# Patient Record
Sex: FEMALE | Race: WHITE | Employment: OTHER | URBAN - METROPOLITAN AREA
[De-identification: names, ages, dates, MRNs, and addresses within clinical notes are randomized per-mention and may not be internally consistent; named-entity substitution may affect disease eponyms.]

---

## 2019-03-05 ENCOUNTER — LAB ONLY (OUTPATIENT)
Dept: INTERNAL MEDICINE CLINIC | Age: 79
End: 2019-03-05

## 2019-03-05 DIAGNOSIS — I10 ESSENTIAL HYPERTENSION: ICD-10-CM

## 2019-03-05 DIAGNOSIS — D36.9 MULTIPLE ADENOMATOUS POLYPS: ICD-10-CM

## 2019-03-05 DIAGNOSIS — R07.89 ATYPICAL CHEST PAIN: ICD-10-CM

## 2019-03-05 DIAGNOSIS — E23.7 LESION OF PITUITARY GLAND (HCC): ICD-10-CM

## 2019-03-05 DIAGNOSIS — E78.2 MIXED HYPERLIPIDEMIA: ICD-10-CM

## 2019-03-05 DIAGNOSIS — I67.89 GENERALIZED ISCHEMIC CEREBROVASCULAR DISEASE: ICD-10-CM

## 2019-03-05 DIAGNOSIS — E03.9 ACQUIRED HYPOTHYROIDISM: Primary | ICD-10-CM

## 2019-03-05 PROBLEM — K21.9 GASTROESOPHAGEAL REFLUX DISEASE WITHOUT ESOPHAGITIS: Status: ACTIVE | Noted: 2019-03-05

## 2019-03-05 PROBLEM — M15.9 PRIMARY OSTEOARTHRITIS INVOLVING MULTIPLE JOINTS: Status: ACTIVE | Noted: 2019-03-05

## 2019-03-05 PROBLEM — J30.1 SEASONAL ALLERGIC RHINITIS DUE TO POLLEN: Status: ACTIVE | Noted: 2019-03-05

## 2019-03-06 LAB
ALBUMIN SERPL-MCNC: 4.5 G/DL (ref 3.5–4.8)
ALBUMIN/GLOB SERPL: 1.6 {RATIO} (ref 1.2–2.2)
ALP SERPL-CCNC: 101 IU/L (ref 39–117)
ALT SERPL-CCNC: 17 IU/L (ref 0–32)
APPEARANCE UR: ABNORMAL
AST SERPL-CCNC: 19 IU/L (ref 0–40)
BACTERIA #/AREA URNS HPF: ABNORMAL /[HPF]
BASOPHILS # BLD AUTO: 0.1 X10E3/UL (ref 0–0.2)
BASOPHILS NFR BLD AUTO: 1 %
BILIRUB SERPL-MCNC: 0.4 MG/DL (ref 0–1.2)
BILIRUB UR QL STRIP: NEGATIVE
BUN SERPL-MCNC: 19 MG/DL (ref 8–27)
BUN/CREAT SERPL: 19 (ref 12–28)
CALCIUM SERPL-MCNC: 9.8 MG/DL (ref 8.7–10.3)
CASTS URNS QL MICRO: ABNORMAL /LPF
CHLORIDE SERPL-SCNC: 101 MMOL/L (ref 96–106)
CHOLEST SERPL-MCNC: 216 MG/DL (ref 100–199)
CO2 SERPL-SCNC: 25 MMOL/L (ref 20–29)
COLOR UR: YELLOW
CREAT SERPL-MCNC: 0.98 MG/DL (ref 0.57–1)
EOSINOPHIL # BLD AUTO: 0.3 X10E3/UL (ref 0–0.4)
EOSINOPHIL NFR BLD AUTO: 5 %
EPI CELLS #/AREA URNS HPF: ABNORMAL /HPF
ERYTHROCYTE [DISTWIDTH] IN BLOOD BY AUTOMATED COUNT: 14.1 % (ref 12.3–15.4)
GLOBULIN SER CALC-MCNC: 2.8 G/DL (ref 1.5–4.5)
GLUCOSE SERPL-MCNC: 88 MG/DL (ref 65–99)
GLUCOSE UR QL: NEGATIVE
HCT VFR BLD AUTO: 43.2 % (ref 34–46.6)
HDLC SERPL-MCNC: 54 MG/DL
HGB BLD-MCNC: 14 G/DL (ref 11.1–15.9)
HGB UR QL STRIP: NEGATIVE
IMM GRANULOCYTES # BLD AUTO: 0 X10E3/UL (ref 0–0.1)
IMM GRANULOCYTES NFR BLD AUTO: 0 %
KETONES UR QL STRIP: NEGATIVE
LDLC SERPL CALC-MCNC: 136 MG/DL (ref 0–99)
LEUKOCYTE ESTERASE UR QL STRIP: ABNORMAL
LYMPHOCYTES # BLD AUTO: 1.8 X10E3/UL (ref 0.7–3.1)
LYMPHOCYTES NFR BLD AUTO: 29 %
MCH RBC QN AUTO: 30.4 PG (ref 26.6–33)
MCHC RBC AUTO-ENTMCNC: 32.4 G/DL (ref 31.5–35.7)
MCV RBC AUTO: 94 FL (ref 79–97)
MICRO URNS: ABNORMAL
MONOCYTES # BLD AUTO: 0.7 X10E3/UL (ref 0.1–0.9)
MONOCYTES NFR BLD AUTO: 12 %
MUCOUS THREADS URNS QL MICRO: PRESENT
NEUTROPHILS # BLD AUTO: 3.4 X10E3/UL (ref 1.4–7)
NEUTROPHILS NFR BLD AUTO: 53 %
NITRITE UR QL STRIP: NEGATIVE
PH UR STRIP: 5.5 [PH] (ref 5–7.5)
PLATELET # BLD AUTO: 261 X10E3/UL (ref 150–379)
POTASSIUM SERPL-SCNC: 4.2 MMOL/L (ref 3.5–5.2)
PROT SERPL-MCNC: 7.3 G/DL (ref 6–8.5)
PROT UR QL STRIP: NEGATIVE
RBC # BLD AUTO: 4.6 X10E6/UL (ref 3.77–5.28)
RBC #/AREA URNS HPF: ABNORMAL /HPF
SODIUM SERPL-SCNC: 139 MMOL/L (ref 134–144)
SP GR UR: 1.02 (ref 1–1.03)
TRIGL SERPL-MCNC: 132 MG/DL (ref 0–149)
TSH SERPL DL<=0.005 MIU/L-ACNC: 2.98 UIU/ML (ref 0.45–4.5)
UROBILINOGEN UR STRIP-MCNC: 0.2 MG/DL (ref 0.2–1)
VLDLC SERPL CALC-MCNC: 26 MG/DL (ref 5–40)
WBC # BLD AUTO: 6.3 X10E3/UL (ref 3.4–10.8)
WBC #/AREA URNS HPF: ABNORMAL /HPF

## 2019-03-12 ENCOUNTER — OFFICE VISIT (OUTPATIENT)
Dept: INTERNAL MEDICINE CLINIC | Age: 79
End: 2019-03-12

## 2019-03-12 VITALS
OXYGEN SATURATION: 98 % | DIASTOLIC BLOOD PRESSURE: 64 MMHG | HEART RATE: 76 BPM | HEIGHT: 64 IN | BODY MASS INDEX: 25.44 KG/M2 | SYSTOLIC BLOOD PRESSURE: 108 MMHG | TEMPERATURE: 97.6 F | RESPIRATION RATE: 18 BRPM | WEIGHT: 149 LBS

## 2019-03-12 DIAGNOSIS — E03.9 ACQUIRED HYPOTHYROIDISM: ICD-10-CM

## 2019-03-12 DIAGNOSIS — I70.90 ATHEROSCLEROSIS: ICD-10-CM

## 2019-03-12 DIAGNOSIS — E78.2 MIXED HYPERLIPIDEMIA: Primary | ICD-10-CM

## 2019-03-12 DIAGNOSIS — E23.7 LESION OF PITUITARY GLAND (HCC): ICD-10-CM

## 2019-03-12 PROBLEM — M85.88 OSTEOPENIA OF LUMBAR SPINE: Status: ACTIVE | Noted: 2019-03-12

## 2019-03-12 RX ORDER — POLYETHYLENE GLYCOL 3350 17 G/17G
17 POWDER, FOR SOLUTION ORAL DAILY
COMMUNITY

## 2019-03-12 RX ORDER — EZETIMIBE 10 MG/1
10 TABLET ORAL DAILY
Qty: 90 TAB | Refills: 3 | Status: SHIPPED | OUTPATIENT
Start: 2019-03-12 | End: 2019-03-15 | Stop reason: SDUPTHER

## 2019-03-12 RX ORDER — OMEPRAZOLE 20 MG/1
20 CAPSULE, DELAYED RELEASE ORAL DAILY
COMMUNITY

## 2019-03-12 RX ORDER — LEVOCETIRIZINE DIHYDROCHLORIDE 5 MG/1
1 TABLET, FILM COATED ORAL DAILY
COMMUNITY
Start: 2018-12-28

## 2019-03-12 RX ORDER — ASPIRIN 81 MG/1
TABLET ORAL DAILY
COMMUNITY

## 2019-03-12 RX ORDER — ATORVASTATIN CALCIUM 10 MG/1
TABLET, FILM COATED ORAL DAILY
COMMUNITY
End: 2019-03-15 | Stop reason: SDUPTHER

## 2019-03-12 RX ORDER — IPRATROPIUM BROMIDE 42 UG/1
2 SPRAY, METERED NASAL 4 TIMES DAILY
COMMUNITY

## 2019-03-12 RX ORDER — LEVOTHYROXINE SODIUM 75 UG/1
TABLET ORAL
COMMUNITY
End: 2019-03-15 | Stop reason: SDUPTHER

## 2019-03-12 NOTE — PROGRESS NOTES
Chief Complaint   Patient presents with    Hypertension    Cholesterol Problem    Thyroid Problem       1. Have you been to the ER, urgent care clinic since your last visit? Hospitalized since your last visit? No    2. Have you seen or consulted any other health care providers outside of the [address] [place] [address] since your last visit? Include any pap smears or colon screening.  No

## 2019-03-12 NOTE — PATIENT INSTRUCTIONS
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

## 2019-03-12 NOTE — PROGRESS NOTES
HPI:     This scot lady presents to the office for transfer of medical care with limited records from [facility] reviewed along with information from [facility]. The information was populated into EMR. Medications were reviewed and reconciled. She reports falling earlier this year after stepping on concrete parking divider. She landed on her head and was able to partially break the fall. She did not lose consciousness nor did she have any concussive symptoms. She was treated in the ED and left forehead laceration cleaned and dressed. CT imaging of the head was negative for bleed but evidence for ischemic cerebrovascular changes and xray of wrist did not reveal fracture. She has fully recovered. She has had inadequately controlled HLP despite adherence to medication although her diet is not restricted. She had repeat labs done prior to this visit. Her GERD has been well controlled with use of Omeprazole without any problems with dysphagia or passage of melenic stools. She has problems with seasonal allergies which have recently flared coinciding with rising tree pollen count. She has Xyzal and Atrovent NS on hand. Her thyroid replacement has been adequate on current dose of Synthroid and denies any problems with palpitations, insomnia or weight loss. She remains independent and continues to handle all her own affairs. She has kept herself cognitively engaged and has a network of friends in the area. Past Medical History:   Diagnosis Date    Hypercholesterolemia     Thyroid disease      Past Surgical History:   Procedure Laterality Date    HX CATARACT REMOVAL Bilateral     HX KNEE REPLACEMENT Bilateral      Current Outpatient Medications   Medication Sig    levothyroxine (SYNTHROID) 75 mcg tablet Take  by mouth Daily (before breakfast).  atorvastatin (LIPITOR) 10 mg tablet Take  by mouth daily.  ipratropium (ATROVENT) 42 mcg (0.06 %) nasal spray 2 Sprays four (4) times daily.     aspirin delayed-release 81 mg tablet Take  by mouth daily.  omeprazole (PRILOSEC) 20 mg capsule Take 20 mg by mouth daily.  levocetirizine (XYZAL) 5 mg tablet Take 1 Tab by mouth daily.  polyethylene glycol (MIRALAX) 17 gram/dose powder Take 17 g by mouth daily. No current facility-administered medications for this visit. Allergies and Intolerances: Allergies   Allergen Reactions    Lisinopril Other (comments)    Toprol Xl [Metoprolol Succinate] Other (comments)     fatigue     Family History:   Family History   Problem Relation Age of Onset    Hypertension Mother     Elevated Lipids Mother     Heart Disease Mother     Alcohol abuse Mother     Stroke Father      Social History:   She  reports that  has never smoked. she has never used smokeless tobacco.   Social History     Substance and Sexual Activity   Alcohol Use No    Frequency: Never    Comment: topped 6 weeks ago     Immunization History:     Flu vaccine, Td, Prevnar and Pneumovax    Diet:                                Weight watchers    Exercise:                        Senior [address] Environment:       3 story home with elevator    Review of Systems   Constitutional: Negative for chills, fever and weight loss. HENT: Negative for hearing loss. Respiratory: Negative for shortness of breath and wheezing. Cardiovascular: Negative for chest pain and palpitations. Gastrointestinal: Positive for heartburn. Negative for blood in stool and melena. Genitourinary: Negative. Neurological: Negative for dizziness and headaches. Psychiatric/Behavioral: Negative for memory loss. The patient does not have insomnia.         Physical:   Visit Vitals  /64 (BP 1 Location: Left arm, BP Patient Position: Sitting)   Pulse 76   Temp 97.6 °F (36.4 °C) (Oral)   Resp 18   Ht 5' 4\" (1.626 m)   Wt 149 lb (67.6 kg)   SpO2 98%   BMI 25.58 kg/m²          Physical Exam   Constitutional: She is well-developed, well-nourished, and in no distress. No distress. HENT:   Right Ear: External ear normal.   Nose: Nose normal.   Mouth/Throat: Oropharynx is clear and moist. No oropharyngeal exudate. Sq nodule left forehead (scar)   Eyes: Conjunctivae are normal. No scleral icterus. Neck: Neck supple. No JVD present. No thyromegaly present. Cardiovascular: Normal rate, normal heart sounds and intact distal pulses. Exam reveals no gallop. No murmur heard. Pulmonary/Chest: Breath sounds normal. She has no wheezes. She has no rales. Abdominal: Soft. Bowel sounds are normal. There is no hepatosplenomegaly. There is no tenderness. Musculoskeletal: She exhibits no edema. Arms:  Thoracic kyphosis   Lymphadenopathy:     She has no cervical adenopathy. Neurological: She is alert. Gait normal.   Skin: Skin is warm. She is not diaphoretic. Review of Data:  Labs:  Lab Only on 03/05/2019   Component Date Value Ref Range Status    WBC 03/05/2019 6.3  3.4 - 10.8 x10E3/uL Final    RBC 03/05/2019 4.60  3.77 - 5.28 x10E6/uL Final    HGB 03/05/2019 14.0  11.1 - 15.9 g/dL Final    HCT 03/05/2019 43.2  34.0 - 46.6 % Final    MCV 03/05/2019 94  79 - 97 fL Final    MCH 03/05/2019 30.4  26.6 - 33.0 pg Final    MCHC 03/05/2019 32.4  31.5 - 35.7 g/dL Final    RDW 03/05/2019 14.1  12.3 - 15.4 % Final    PLATELET 90/59/7939 309  150 - 379 x10E3/uL Final    NEUTROPHILS 03/05/2019 53  Not Estab. % Final    Lymphocytes 03/05/2019 29  Not Estab. % Final    MONOCYTES 03/05/2019 12  Not Estab. % Final    EOSINOPHILS 03/05/2019 5  Not Estab. % Final    BASOPHILS 03/05/2019 1  Not Estab. % Final    ABS. NEUTROPHILS 03/05/2019 3.4  1.4 - 7.0 x10E3/uL Final    Abs Lymphocytes 03/05/2019 1.8  0.7 - 3.1 x10E3/uL Final    ABS. MONOCYTES 03/05/2019 0.7  0.1 - 0.9 x10E3/uL Final    ABS. EOSINOPHILS 03/05/2019 0.3  0.0 - 0.4 x10E3/uL Final    ABS.  BASOPHILS 03/05/2019 0.1  0.0 - 0.2 x10E3/uL Final    IMMATURE GRANULOCYTES 03/05/2019 0  Not Estab. % Final    ABS. IMM. GRANS. 03/05/2019 0.0  0.0 - 0.1 x10E3/uL Final    Glucose 03/05/2019 88  65 - 99 mg/dL Final    BUN 03/05/2019 19  8 - 27 mg/dL Final    Creatinine 03/05/2019 0.98  0.57 - 1.00 mg/dL Final    GFR est non-AA 03/05/2019 55* >59 mL/min/1.73 Final    GFR est AA 03/05/2019 64  >59 mL/min/1.73 Final    BUN/Creatinine ratio 03/05/2019 19  12 - 28 Final    Sodium 03/05/2019 139  134 - 144 mmol/L Final    Potassium 03/05/2019 4.2  3.5 - 5.2 mmol/L Final    Chloride 03/05/2019 101  96 - 106 mmol/L Final    CO2 03/05/2019 25  20 - 29 mmol/L Final    Calcium 03/05/2019 9.8  8.7 - 10.3 mg/dL Final    Protein, total 03/05/2019 7.3  6.0 - 8.5 g/dL Final    Albumin 03/05/2019 4.5  3.5 - 4.8 g/dL Final    GLOBULIN, TOTAL 03/05/2019 2.8  1.5 - 4.5 g/dL Final    A-G Ratio 03/05/2019 1.6  1.2 - 2.2 Final    Bilirubin, total 03/05/2019 0.4  0.0 - 1.2 mg/dL Final    Alk. phosphatase 03/05/2019 101  39 - 117 IU/L Final    AST (SGOT) 03/05/2019 19  0 - 40 IU/L Final    ALT (SGPT) 03/05/2019 17  0 - 32 IU/L Final    Specific Gravity 03/05/2019 1.017  1.005 - 1.030 Final    pH (UA) 03/05/2019 5.5  5.0 - 7.5 Final    Color 03/05/2019 Yellow  Yellow Final    Appearance 03/05/2019 Cloudy* Clear Final    Leukocyte Esterase 03/05/2019 1+* Negative Final    Protein 03/05/2019 Negative  Negative/Trace Final    Glucose 03/05/2019 Negative  Negative Final    Ketone 03/05/2019 Negative  Negative Final    Blood 03/05/2019 Negative  Negative Final    Bilirubin 03/05/2019 Negative  Negative Final    Urobilinogen 03/05/2019 0.2  0.2 - 1.0 mg/dL Final    Nitrites 03/05/2019 Negative  Negative Final    Microscopic Examination 03/05/2019 See additional order   Final    WBC 03/05/2019 6-10* 0 - 5 /hpf Final    RBC 03/05/2019 0-2  0 - 2 /hpf Final    Epithelial cells 03/05/2019 0-10  0 - 10 /hpf Final    Casts 03/05/2019 None seen  None seen /lpf Final    Mucus 03/05/2019 Present  Not Estab. Final    Bacteria 03/05/2019 Few  None seen/Few Final    Cholesterol, total 03/05/2019 216* 100 - 199 mg/dL Final    Triglyceride 03/05/2019 132  0 - 149 mg/dL Final    HDL Cholesterol 03/05/2019 54  >39 mg/dL Final    VLDL, calculated 03/05/2019 26  5 - 40 mg/dL Final    LDL, calculated 03/05/2019 136* 0 - 99 mg/dL Final    TSH 03/05/2019 2.980  0.450 - 4.500 uIU/mL Final         Health Maintenance Due   Topic Date Due    Shingrix Vaccine Age 50> (1 of 2) 08/02/1990    GLAUCOMA SCREENING Q2Y  08/02/2005    Bone Densitometry (Dexa) Screening  08/02/2005    Pneumococcal 65+ Low/Medium Risk (2 of 2 - PPSV23) 06/01/2017    Influenza Age 5 to Adult  08/01/2018    MEDICARE YEARLY EXAM  03/05/2019          Impression/Plan:   Patient Active Problem List   Diagnosis  Code    Closed head injury with laceration, healed Treatment completed, confirm Td booster up to date S09.90XA      Hyperlipidemia with moderate atherosclerotic burden, uncontrolled   Add Zetia 10mg qhs to Lipitor an repeat FLP with next visit E78.,5    Essential hypertension controlled   Continue current regimen I10    Gastroesophageal reflux disease without esophagitis Determine if PPI can be taken prn, review records for prior EGD, anti-reflux measures K21.9    Acquired hypothyroidism, replaced   Continue current regimen and repeat TSH with next visit E03.9    Primary osteoarthritis involving multiple joints Avoid NSAID, consider anti-inflammatory medication M15.0    Seasonal allergic rhinitis due to pollen   Continue Xyzal and Atrovent NS J30.1    Multiple adenomatous polyps Determine date of next colonoscopy D36.9         Andressa Delgado MD

## 2019-03-15 DIAGNOSIS — E03.9 ACQUIRED HYPOTHYROIDISM: Primary | ICD-10-CM

## 2019-03-15 DIAGNOSIS — E78.2 MIXED HYPERLIPIDEMIA: ICD-10-CM

## 2019-03-15 RX ORDER — LEVOTHYROXINE SODIUM 75 UG/1
75 TABLET ORAL
Qty: 90 TAB | Refills: 3 | Status: SHIPPED | OUTPATIENT
Start: 2019-03-15

## 2019-03-15 RX ORDER — EZETIMIBE 10 MG/1
10 TABLET ORAL DAILY
Qty: 90 TAB | Refills: 3 | Status: SHIPPED | OUTPATIENT
Start: 2019-03-15 | End: 2019-08-26 | Stop reason: SDDI

## 2019-03-15 RX ORDER — ATORVASTATIN CALCIUM 10 MG/1
10 TABLET, FILM COATED ORAL DAILY
Qty: 90 TAB | Refills: 3 | Status: SHIPPED | OUTPATIENT
Start: 2019-03-15 | End: 2019-09-01 | Stop reason: SINTOL

## 2019-06-04 ENCOUNTER — OFFICE VISIT (OUTPATIENT)
Dept: INTERNAL MEDICINE CLINIC | Age: 79
End: 2019-06-04

## 2019-06-04 VITALS
TEMPERATURE: 98 F | HEART RATE: 93 BPM | WEIGHT: 144 LBS | HEIGHT: 64 IN | BODY MASS INDEX: 24.59 KG/M2 | DIASTOLIC BLOOD PRESSURE: 61 MMHG | OXYGEN SATURATION: 99 % | SYSTOLIC BLOOD PRESSURE: 98 MMHG | RESPIRATION RATE: 18 BRPM

## 2019-06-04 DIAGNOSIS — R05.9 COUGH: Primary | ICD-10-CM

## 2019-06-04 RX ORDER — METHYLPREDNISOLONE 4 MG/1
TABLET ORAL
Qty: 1 DOSE PACK | Refills: 0 | Status: SHIPPED | OUTPATIENT
Start: 2019-06-04 | End: 2019-08-26

## 2019-06-04 RX ORDER — METHYLPREDNISOLONE 4 MG/1
TABLET ORAL
Qty: 1 DOSE PACK | Refills: 0 | Status: SHIPPED | OUTPATIENT
Start: 2019-06-04 | End: 2019-06-04 | Stop reason: SDUPTHER

## 2019-06-04 RX ORDER — DOXYCYCLINE 100 MG/1
100 CAPSULE ORAL 2 TIMES DAILY
Qty: 20 CAP | Refills: 0 | Status: SHIPPED | OUTPATIENT
Start: 2019-06-04 | End: 2019-06-04 | Stop reason: SDUPTHER

## 2019-06-04 RX ORDER — DOXYCYCLINE 100 MG/1
100 CAPSULE ORAL 2 TIMES DAILY
Qty: 20 CAP | Refills: 0 | Status: SHIPPED | OUTPATIENT
Start: 2019-06-04 | End: 2019-08-26

## 2019-06-04 NOTE — PATIENT INSTRUCTIONS
Bronchitis/Sinusitis    Doxycycline 100mg twice a day x 10 days with food  Medrol Ajit for congestion  Mucinex to think mucous  Fluids, Vitamin C

## 2019-06-04 NOTE — PROGRESS NOTES
Chief Complaint   Patient presents with    Cough     congestion with small amount of mucus     1. Have you been to the ER, urgent care clinic since your last visit? Hospitalized since your last visit? No    2. Have you seen or consulted any other health care providers outside of the [address] [place] [address] since your last visit? Include any pap smears or colon screening.  No

## 2019-06-04 NOTE — PROGRESS NOTES
HPI:     This lady presents to the office with 5 day history sinus congestion, clear rhinorrhea, sore throat, dry barking cough, sweats, myalgias and lassitude. She has taken Keke Hoopa Max Strength without relief. Her  has had similar symptoms and is current under treatment. She is up to date on all her immunizations. Past Medical History:   Diagnosis Date    Hypercholesterolemia     Thyroid disease      Past Surgical History:   Procedure Laterality Date    HX CATARACT REMOVAL Bilateral     HX KNEE REPLACEMENT Bilateral     HX KNEE REPLACEMENT Bilateral     2000 and 2012    HX TONSILLECTOMY  1960     Current Outpatient Medications   Medication Sig    atorvastatin (LIPITOR) 10 mg tablet Take 1 Tab by mouth daily.  ezetimibe (ZETIA) 10 mg tablet Take 1 Tab by mouth daily. Take with Lipitor for better cholesterol control    levothyroxine (SYNTHROID) 75 mcg tablet Take 1 Tab by mouth Daily (before breakfast).  ipratropium (ATROVENT) 42 mcg (0.06 %) nasal spray 2 Sprays four (4) times daily.  aspirin delayed-release 81 mg tablet Take  by mouth daily.  omeprazole (PRILOSEC) 20 mg capsule Take 20 mg by mouth daily.  levocetirizine (XYZAL) 5 mg tablet Take 1 Tab by mouth daily.  polyethylene glycol (MIRALAX) 17 gram/dose powder Take 17 g by mouth daily. No current facility-administered medications for this visit. Allergies and Intolerances: Allergies   Allergen Reactions    Lisinopril Other (comments)     rhinitis    Toprol Xl [Metoprolol Succinate] Other (comments)     fatigue     Family History:   Family History   Problem Relation Age of Onset    Hypertension Mother     Elevated Lipids Mother     Heart Disease Mother     Alcohol abuse Mother     Stroke Father     Heart Disease Sister      Social History:   She  reports that she has never smoked.  She has never used smokeless tobacco.   Social History     Substance and Sexual Activity   Alcohol Use No    Frequency: Never    Comment: topped 6 weeks ago       Physical:   Visit Vitals  BP 98/61 (BP 1 Location: Left arm, BP Patient Position: Sitting)   Pulse 93   Temp 98 °F (36.7 °C) (Oral)   Resp 18   Ht 5' 4\" (1.626 m)   Wt 144 lb (65.3 kg)   SpO2 99%   BMI 24.72 kg/m²          Physical Exam   Constitutional: She is well-developed, well-nourished, and in no distress. HENT:   Right Ear: External ear normal.   Left Ear: External ear normal.   Mouth/Throat: Oropharynx is clear and moist.   Mucoid effusion both TMs, + maxillary sinus tenderness   Eyes: Conjunctivae are normal. No scleral icterus. Neck: No JVD present. Cardiovascular: Normal rate and normal heart sounds. Pulmonary/Chest:   Deep barking cough with otherwise clear lungs   Lymphadenopathy:     She has no cervical adenopathy. Vitals reviewed. Review of Data:  Labs:     Impression/Plan:   Patient Active Problem List   Diagnosis  Code    Sinusitis/Bronchitis Doxycycline 100mg bid x 7 days, Medrol Ajit, Mucinex, Fluids, Rest J01.90, J20.8         Obdulio Smith MD

## 2019-08-14 ENCOUNTER — TELEPHONE (OUTPATIENT)
Dept: INTERNAL MEDICINE CLINIC | Age: 79
End: 2019-08-14

## 2019-08-14 DIAGNOSIS — E78.5 HYPERLIPIDEMIA, UNSPECIFIED HYPERLIPIDEMIA TYPE: ICD-10-CM

## 2019-08-14 DIAGNOSIS — E03.9 ACQUIRED HYPOTHYROIDISM: ICD-10-CM

## 2019-08-14 DIAGNOSIS — I10 ESSENTIAL HYPERTENSION: Primary | ICD-10-CM

## 2019-08-14 DIAGNOSIS — M85.88 OSTEOPENIA OF LUMBAR SPINE: ICD-10-CM

## 2019-08-20 ENCOUNTER — HOSPITAL ENCOUNTER (OUTPATIENT)
Dept: LAB | Age: 79
Discharge: HOME | End: 2019-08-20
Payer: MEDICARE

## 2019-08-20 DIAGNOSIS — E03.9 ACQUIRED HYPOTHYROIDISM: ICD-10-CM

## 2019-08-20 DIAGNOSIS — E78.5 HYPERLIPIDEMIA, UNSPECIFIED HYPERLIPIDEMIA TYPE: ICD-10-CM

## 2019-08-20 DIAGNOSIS — I10 ESSENTIAL HYPERTENSION: ICD-10-CM

## 2019-08-20 LAB
ALBUMIN SERPL-MCNC: 3.8 G/DL (ref 3.4–5)
ALBUMIN/GLOB SERPL: 1.3 {RATIO} (ref 0.8–1.7)
ALP SERPL-CCNC: 85 U/L (ref 45–117)
ALT SERPL-CCNC: 24 U/L (ref 13–56)
ANION GAP SERPL CALC-SCNC: 5 MMOL/L (ref 3–18)
APPEARANCE UR: CLEAR
AST SERPL-CCNC: 17 U/L (ref 10–38)
BACTERIA URNS QL MICRO: NEGATIVE /HPF
BILIRUB SERPL-MCNC: 0.4 MG/DL (ref 0.2–1)
BILIRUB UR QL: NEGATIVE
BUN SERPL-MCNC: 16 MG/DL (ref 7–18)
BUN/CREAT SERPL: 18 (ref 12–20)
CALCIUM SERPL-MCNC: 8.8 MG/DL (ref 8.5–10.1)
CHLORIDE SERPL-SCNC: 110 MMOL/L (ref 100–111)
CHOLEST SERPL-MCNC: 213 MG/DL
CK SERPL-CCNC: 132 U/L (ref 26–192)
CO2 SERPL-SCNC: 28 MMOL/L (ref 21–32)
COLOR UR: YELLOW
CREAT SERPL-MCNC: 0.91 MG/DL (ref 0.6–1.3)
EPITH CASTS URNS QL MICRO: ABNORMAL /LPF (ref 0–5)
ERYTHROCYTE [DISTWIDTH] IN BLOOD BY AUTOMATED COUNT: 15.4 % (ref 11.6–14.5)
GLOBULIN SER CALC-MCNC: 3 G/DL (ref 2–4)
GLUCOSE SERPL-MCNC: 88 MG/DL (ref 74–99)
GLUCOSE UR STRIP.AUTO-MCNC: NEGATIVE MG/DL
HCT VFR BLD AUTO: 41.9 % (ref 35–45)
HDLC SERPL-MCNC: 60 MG/DL (ref 40–60)
HDLC SERPL: 3.6 {RATIO} (ref 0–5)
HGB BLD-MCNC: 13.4 G/DL (ref 12–16)
HGB UR QL STRIP: NEGATIVE
KETONES UR QL STRIP.AUTO: NEGATIVE MG/DL
LDLC SERPL CALC-MCNC: 124.6 MG/DL (ref 0–100)
LEUKOCYTE ESTERASE UR QL STRIP.AUTO: ABNORMAL
LIPID PROFILE,FLP: ABNORMAL
MCH RBC QN AUTO: 30 PG (ref 24–34)
MCHC RBC AUTO-ENTMCNC: 32 G/DL (ref 31–37)
MCV RBC AUTO: 93.9 FL (ref 74–97)
NITRITE UR QL STRIP.AUTO: NEGATIVE
PH UR STRIP: 5.5 [PH] (ref 5–8)
PLATELET # BLD AUTO: 229 K/UL (ref 135–420)
PMV BLD AUTO: 11.8 FL (ref 9.2–11.8)
POTASSIUM SERPL-SCNC: 4.4 MMOL/L (ref 3.5–5.5)
PROT SERPL-MCNC: 6.8 G/DL (ref 6.4–8.2)
PROT UR STRIP-MCNC: NEGATIVE MG/DL
RBC # BLD AUTO: 4.46 M/UL (ref 4.2–5.3)
RBC #/AREA URNS HPF: 0 /HPF (ref 0–5)
SODIUM SERPL-SCNC: 143 MMOL/L (ref 136–145)
SP GR UR REFRACTOMETRY: 1.01 (ref 1–1.03)
TRIGL SERPL-MCNC: 142 MG/DL (ref ?–150)
TSH SERPL DL<=0.05 MIU/L-ACNC: 0.94 UIU/ML (ref 0.36–3.74)
UROBILINOGEN UR QL STRIP.AUTO: 0.2 EU/DL (ref 0.2–1)
VLDLC SERPL CALC-MCNC: 28.4 MG/DL
WBC # BLD AUTO: 4.4 K/UL (ref 4.6–13.2)
WBC URNS QL MICRO: ABNORMAL /HPF (ref 0–4)

## 2019-08-20 PROCEDURE — 84443 ASSAY THYROID STIM HORMONE: CPT

## 2019-08-20 PROCEDURE — 80061 LIPID PANEL: CPT

## 2019-08-20 PROCEDURE — 81001 URINALYSIS AUTO W/SCOPE: CPT

## 2019-08-20 PROCEDURE — 85027 COMPLETE CBC AUTOMATED: CPT

## 2019-08-20 PROCEDURE — 80053 COMPREHEN METABOLIC PANEL: CPT

## 2019-08-20 PROCEDURE — 82550 ASSAY OF CK (CPK): CPT

## 2019-08-26 ENCOUNTER — OFFICE VISIT (OUTPATIENT)
Dept: INTERNAL MEDICINE CLINIC | Age: 79
End: 2019-08-26

## 2019-08-26 VITALS
HEIGHT: 64 IN | SYSTOLIC BLOOD PRESSURE: 102 MMHG | WEIGHT: 151 LBS | TEMPERATURE: 98 F | RESPIRATION RATE: 18 BRPM | BODY MASS INDEX: 25.78 KG/M2 | DIASTOLIC BLOOD PRESSURE: 63 MMHG | HEART RATE: 97 BPM | OXYGEN SATURATION: 97 %

## 2019-08-26 DIAGNOSIS — E03.9 ACQUIRED HYPOTHYROIDISM: ICD-10-CM

## 2019-08-26 DIAGNOSIS — R53.83 FATIGUE, UNSPECIFIED TYPE: ICD-10-CM

## 2019-08-26 DIAGNOSIS — I70.90 ATHEROSCLEROSIS: ICD-10-CM

## 2019-08-26 DIAGNOSIS — D36.9 MULTIPLE ADENOMATOUS POLYPS: Primary | ICD-10-CM

## 2019-08-26 DIAGNOSIS — I67.89 GENERALIZED ISCHEMIC CEREBROVASCULAR DISEASE: ICD-10-CM

## 2019-08-26 PROBLEM — R07.89 ATYPICAL CHEST PAIN: Status: RESOLVED | Noted: 2019-03-05 | Resolved: 2019-08-26

## 2019-08-26 PROBLEM — I10 ESSENTIAL HYPERTENSION: Status: RESOLVED | Noted: 2019-03-05 | Resolved: 2019-08-26

## 2019-08-26 PROBLEM — I65.23 CAROTID ARTERY PLAQUE, BILATERAL: Status: ACTIVE | Noted: 2019-08-26

## 2019-08-26 NOTE — PROGRESS NOTES
HPI:     This scot lady would like a recommendation for a new PCP as I am leaving the practice. I have recommended Dr. Trish Conti or Dr. Nithya Colon as she and her  live in [facility]. She complains of feeling exhausted after her exercise routine and finds herself taking a nap in the afternoon which invariably makes it difficult for her sleep later that night. She denies any exertional chest pain, dyspnea, myalgias, rash or weight loss. She is diligent about taking her Synthroid and most recent TSH was within desired range. I do not have the old records to determine if she had prior testing for Hashimoto's. I prescribed Zetia to add on to Lipitor because of inadequate LDL lowering in setting of ischemic cerebrovascular changes but never had this filled. There was concern that statin may be the culprit behind the fatigue. She has been labeled with diagnosis of HTN and required medical therapy in the past although her BP has remained normal off medication. Her memory continues to serve her well and still handles all her own affairs. She remains cognitively and socially engaged. Past Medical History:   Diagnosis Date    Hypercholesterolemia     Thyroid disease      Past Surgical History:   Procedure Laterality Date    HX CATARACT REMOVAL Bilateral     HX KNEE REPLACEMENT Bilateral     HX KNEE REPLACEMENT Bilateral     2000 and 2012    HX TONSILLECTOMY  1960     Current Outpatient Medications   Medication Sig    atorvastatin (LIPITOR) 10 mg tablet Take 1 Tab by mouth daily.  levothyroxine (SYNTHROID) 75 mcg tablet Take 1 Tab by mouth Daily (before breakfast).  ipratropium (ATROVENT) 42 mcg (0.06 %) nasal spray 2 Sprays four (4) times daily.  aspirin delayed-release 81 mg tablet Take  by mouth daily.  omeprazole (PRILOSEC) 20 mg capsule Take 20 mg by mouth daily.  levocetirizine (XYZAL) 5 mg tablet Take 1 Tab by mouth daily.     polyethylene glycol (MIRALAX) 17 gram/dose powder Take 17 g by mouth daily. No current facility-administered medications for this visit. Allergies and Intolerances: Allergies   Allergen Reactions    Lisinopril Other (comments)     rhinitis    Toprol Xl [Metoprolol Succinate] Other (comments)     fatigue     Family History:   Family History   Problem Relation Age of Onset    Hypertension Mother     Elevated Lipids Mother     Heart Disease Mother     Alcohol abuse Mother     Stroke Father     Heart Disease Sister      Social History:   She  reports that she has never smoked. She has never used smokeless tobacco.   Social History     Substance and Sexual Activity   Alcohol Use No    Frequency: Never    Comment: topped 6 weeks ago     Immunization History:    Prevnar, Pneumovax and Td received    Diet:                                Sensible    Exercise:                        Senior Strengthening Program    Screening:                      Colonoscopy 2017, mammogram and DEXA (Dr. Tarqi Fonseca)    Review of Systems   Constitutional: Positive for malaise/fatigue. Negative for weight loss. HENT: Negative for hearing loss. Eyes: Negative for blurred vision. Respiratory: Negative for shortness of breath. Cardiovascular: Negative for chest pain and palpitations. Gastrointestinal: Negative for constipation and heartburn. Genitourinary: Negative for urgency. Musculoskeletal: Negative for joint pain. Neurological: Negative for dizziness and headaches. Psychiatric/Behavioral: Negative for depression and memory loss. Physical:   Visit Vitals  /63   Pulse 97   Temp 98 °F (36.7 °C) (Oral)   Resp 18   Ht 5' 4\" (1.626 m)   Wt 151 lb (68.5 kg)   SpO2 97%   BMI 25.92 kg/m²          Physical Exam   Constitutional: She is well-developed, well-nourished, and in no distress. HENT:   Mouth/Throat: Oropharynx is clear and moist.   Cerumen AU   Eyes: Conjunctivae are normal. No scleral icterus. Neck: No JVD present.    Cardiovascular: Normal rate, regular rhythm, normal heart sounds and intact distal pulses. Exam reveals no gallop. No murmur heard. Pulmonary/Chest: Breath sounds normal.   Abdominal: Soft. Bowel sounds are normal. She exhibits no mass. There is no tenderness. Musculoskeletal: She exhibits no edema. Lymphadenopathy:     She has no cervical adenopathy. Neurological: She is alert. She has normal sensation, normal strength and intact cranial nerves. She displays no tremor.  Gait normal.   MMSE 30/30, fluent speech, answered questions with alacrity        Review of Data:  Labs:  Hospital Outpatient Visit on 08/20/2019   Component Date Value Ref Range Status    WBC 08/20/2019 4.4* 4.6 - 13.2 K/uL Final    RBC 08/20/2019 4.46  4.20 - 5.30 M/uL Final    HGB 08/20/2019 13.4  12.0 - 16.0 g/dL Final    HCT 08/20/2019 41.9  35.0 - 45.0 % Final    MCV 08/20/2019 93.9  74.0 - 97.0 FL Final    MCH 08/20/2019 30.0  24.0 - 34.0 PG Final    MCHC 08/20/2019 32.0  31.0 - 37.0 g/dL Final    RDW 08/20/2019 15.4* 11.6 - 14.5 % Final    PLATELET 03/77/8981 401  135 - 420 K/uL Final    MPV 08/20/2019 11.8  9.2 - 11.8 FL Final    CK 08/20/2019 132  26 - 192 U/L Final    LIPID PROFILE 08/20/2019        Final    Cholesterol, total 08/20/2019 213* <200 MG/DL Final    Triglyceride 08/20/2019 142  <150 MG/DL Final    HDL Cholesterol 08/20/2019 60  40 - 60 MG/DL Final    LDL, calculated 08/20/2019 124.6* 0 - 100 MG/DL Final    VLDL, calculated 08/20/2019 28.4  MG/DL Final    CHOL/HDL Ratio 08/20/2019 3.6  0 - 5.0   Final    Sodium 08/20/2019 143  136 - 145 mmol/L Final    Potassium 08/20/2019 4.4  3.5 - 5.5 mmol/L Final    Chloride 08/20/2019 110  100 - 111 mmol/L Final    CO2 08/20/2019 28  21 - 32 mmol/L Final    Anion gap 08/20/2019 5  3.0 - 18 mmol/L Final    Glucose 08/20/2019 88  74 - 99 mg/dL Final    BUN 08/20/2019 16  7.0 - 18 MG/DL Final    Creatinine 08/20/2019 0.91  0.6 - 1.3 MG/DL Final    BUN/Creatinine ratio 08/20/2019 18  12 - 20   Final    GFR est AA 08/20/2019 >60  >60 ml/min/1.73m2 Final    GFR est non-AA 08/20/2019 60* >60 ml/min/1.73m2 Final    Calcium 08/20/2019 8.8  8.5 - 10.1 MG/DL Final    Bilirubin, total 08/20/2019 0.4  0.2 - 1.0 MG/DL Final    ALT (SGPT) 08/20/2019 24  13 - 56 U/L Final    AST (SGOT) 08/20/2019 17  10 - 38 U/L Final    Alk.  phosphatase 08/20/2019 85  45 - 117 U/L Final    Protein, total 08/20/2019 6.8  6.4 - 8.2 g/dL Final    Albumin 08/20/2019 3.8  3.4 - 5.0 g/dL Final    Globulin 08/20/2019 3.0  2.0 - 4.0 g/dL Final    A-G Ratio 08/20/2019 1.3  0.8 - 1.7   Final    TSH 08/20/2019 0.94  0.36 - 3.74 uIU/mL Final    Color 08/20/2019 YELLOW    Final    Appearance 08/20/2019 CLEAR    Final    Specific gravity 08/20/2019 1.015  1.005 - 1.030   Final    pH (UA) 08/20/2019 5.5  5.0 - 8.0   Final    Protein 08/20/2019 NEGATIVE   NEG mg/dL Final    Glucose 08/20/2019 NEGATIVE   NEG mg/dL Final    Ketone 08/20/2019 NEGATIVE   NEG mg/dL Final    Bilirubin 08/20/2019 NEGATIVE   NEG   Final    Blood 08/20/2019 NEGATIVE   NEG   Final    Urobilinogen 08/20/2019 0.2  0.2 - 1.0 EU/dL Final    Nitrites 08/20/2019 NEGATIVE   NEG   Final    Leukocyte Esterase 08/20/2019 MODERATE* NEG   Final    WBC 08/20/2019 6 to 8  0 - 4 /hpf Final    RBC 08/20/2019 0  0 - 5 /hpf Final    Epithelial cells 08/20/2019 FEW  0 - 5 /lpf Final    Bacteria 08/20/2019 NEGATIVE   NEG /hpf Final       Impression/Plan:   Patient Active Problem List   Diagnosis  Code    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 Carotid Artery Plaquing  2.  Ischemic cerebrovascular changes   Optimize risk reduction strategy I70.90, I67.89    Osteopenia of lumbar spine Obtain copy of last DEXA from Dr. Kalyani Anderson, determine if she has had T spine series to assess for compression fractures M85.88             Kathy Howard MD

## 2019-08-26 NOTE — PROGRESS NOTES
Chief Complaint   Patient presents with    Hypertension    Cholesterol Problem   1. Have you been to the ER, urgent care clinic since your last visit? Hospitalized since your last visit? No    2. Have you seen or consulted any other health care providers outside of the [address] [place] [address] since your last visit? Include any pap smears or colon screening.  No

## 2019-08-26 NOTE — PATIENT INSTRUCTIONS
Fatigue could be related to Lipitor>>Trial OFF LIPITOR for a week and observe, if not improved>>will do further investigation including echocardiogram    Cholesterol is NOT well controlled>>consider change to different statin that does not make you tired    Atherosclerosis in brain from cholesterol and age>>will need lipid lowering agent    Memory testing superb    Posture needs work>>kyphosis    Immunizations:   Flu Shot in October, confirm type of shingles vaccine    Screening:          Colonoscopy 2017 Dr. Adame Overcast, mammogram and DEXA to be confirmed

## 2019-09-01 DIAGNOSIS — G72.0 STATIN MYOPATHY: ICD-10-CM

## 2019-09-01 DIAGNOSIS — T46.6X5A STATIN MYOPATHY: ICD-10-CM

## 2019-09-01 NOTE — TELEPHONE ENCOUNTER
Patient advised me that she feels so much better OFF Lipitor.  Advised to discuss alternative with new PCP